# Patient Record
Sex: FEMALE | Race: WHITE | URBAN - METROPOLITAN AREA
[De-identification: names, ages, dates, MRNs, and addresses within clinical notes are randomized per-mention and may not be internally consistent; named-entity substitution may affect disease eponyms.]

---

## 2021-11-24 ENCOUNTER — EMERGENCY (EMERGENCY)
Facility: HOSPITAL | Age: 31
LOS: 1 days | Discharge: HOME | End: 2021-11-24
Admitting: STUDENT IN AN ORGANIZED HEALTH CARE EDUCATION/TRAINING PROGRAM
Payer: COMMERCIAL

## 2021-11-24 VITALS
TEMPERATURE: 98 F | OXYGEN SATURATION: 100 % | SYSTOLIC BLOOD PRESSURE: 132 MMHG | DIASTOLIC BLOOD PRESSURE: 74 MMHG | HEART RATE: 61 BPM | RESPIRATION RATE: 18 BRPM

## 2021-11-24 PROCEDURE — 99282 EMERGENCY DEPT VISIT SF MDM: CPT

## 2021-11-24 NOTE — ED PROVIDER NOTE - CLINICAL SUMMARY MEDICAL DECISION MAKING FREE TEXT BOX
32 y/o F employee sp needlestick injury, low risk exposure, pt does not want PEP. Tetanus and Hep B up to date. Blue packet filled out and patient referred to [facility].

## 2021-11-24 NOTE — ED PROVIDER NOTE - PATIENT PORTAL LINK FT
You can access the FollowMyHealth Patient Portal offered by [facility] by registering at the following website: [email or web address]. By joining [facility]’s FollowMyHealth portal, you will also be able to view your health information using other applications (apps) compatible with our system.

## 2021-11-24 NOTE — ED PROVIDER NOTE - NSFOLLOWUPINSTRUCTIONS_ED_ALL_ED_FT
Follow up with [facility] in 2-3 days [phone].  Clean affected area with soap and water.  Return to the ER for any persistent/worsening or new symptoms fevers, chills, redness, weakness, numbness or any concerning symptoms.

## 2021-11-24 NOTE — ED PROVIDER NOTE - OBJECTIVE STATEMENT
32 y/o F employee here c/o needlestick to the R thumb, occurring less then an hour ago. Patient was checking the source patient's fingerstick, when she scratched her gloved R thumb with the lancet. The lancet had visible blood. The patient cleaned the thumb with alcohol, then washed her hands with soap and water. Tetanus and Hep B up to date. No other complaints.

## 2022-03-07 PROBLEM — Z78.9 OTHER SPECIFIED HEALTH STATUS: Chronic | Status: ACTIVE | Noted: 2021-11-24

## 2022-05-04 PROBLEM — Z00.00 ENCOUNTER FOR PREVENTIVE HEALTH EXAMINATION: Status: ACTIVE | Noted: 2022-05-04

## 2022-06-29 ENCOUNTER — APPOINTMENT (OUTPATIENT)
Dept: UROGYNECOLOGY | Facility: CLINIC | Age: 32
End: 2022-06-29

## 2022-06-29 VITALS — BODY MASS INDEX: 23.55 KG/M2 | HEIGHT: 62 IN | WEIGHT: 128 LBS

## 2022-06-29 DIAGNOSIS — N93.0 POSTCOITAL AND CONTACT BLEEDING: ICD-10-CM

## 2022-06-29 PROCEDURE — 99203 OFFICE O/P NEW LOW 30 MIN: CPT

## 2022-07-01 DIAGNOSIS — Z78.9 OTHER SPECIFIED HEALTH STATUS: ICD-10-CM

## 2022-07-01 DIAGNOSIS — Z81.8 FAMILY HISTORY OF OTHER MENTAL AND BEHAVIORAL DISORDERS: ICD-10-CM

## 2022-07-01 DIAGNOSIS — Z80.3 FAMILY HISTORY OF MALIGNANT NEOPLASM OF BREAST: ICD-10-CM

## 2022-07-01 DIAGNOSIS — Z83.438 FAMILY HISTORY OF OTHER DISORDER OF LIPOPROTEIN METABOLISM AND OTHER LIPIDEMIA: ICD-10-CM

## 2022-07-01 RX ORDER — SPIRONOLACTONE 100 MG/1
100 TABLET ORAL
Refills: 0 | Status: ACTIVE | COMMUNITY

## 2022-07-08 NOTE — DISCUSSION/SUMMARY
[FreeTextEntry1] : Reviewed the above findings with the patient.  We discussed treatment options of doing nothing, versus continued medical and physical therapy management as well as surgical correction with reverse perineoplasty as well as excision of the tissue where the labia are meeting and sending that to pathology as well.  Risks and benefits were discussed we discussed the possibility of worsening dyspareunia and worsening of the condition postoperatively due to scar tissue.  Multiple questions were answered she has an appointment with Jessica Wooten  in August and will make a decision after that if she wishes to proceed with surgical correction she will call this office if she dissociates to proceed with surgical correction.  All questions were answered

## 2022-07-08 NOTE — HISTORY OF PRESENT ILLNESS
[FreeTextEntry1] : Since she has been with a new partner develops tear/fissure in the vagina - lubricates well intercoures - godd relationship with partner  + pain with penetration. pt had intercourse last night.\par has tried vaginal dilators.\par No vaginal deliveries \par tried valium/baclofem suppostiories and PT

## 2022-07-08 NOTE — PHYSICAL EXAM
[Chaperone Present] : A chaperone was present in the examining room during all aspects of the physical examination [No Acute Distress] : in no acute distress [Well developed] : well developed [Well Nourished] : ~L well nourished [Normal Mood/Affect] : mood and affect are normal [Warm and Dry] : was warm and dry to touch [Normal Appearance] : general appearance was normal [Normal] : normal [Anxiety] : patient is not anxious [Tenderness] : ~T no ~M abdominal tenderness observed [Distended] : not distended [de-identified] : + 2mm fissure/tear posterior slightly to patients left where labia meet eachother.

## 2022-08-03 ENCOUNTER — APPOINTMENT (OUTPATIENT)
Dept: UROGYNECOLOGY | Facility: CLINIC | Age: 32
End: 2022-08-03

## 2022-08-03 VITALS
TEMPERATURE: 97.9 F | SYSTOLIC BLOOD PRESSURE: 135 MMHG | WEIGHT: 128 LBS | BODY MASS INDEX: 23.55 KG/M2 | HEIGHT: 62 IN | DIASTOLIC BLOOD PRESSURE: 79 MMHG

## 2022-08-03 PROCEDURE — 99215 OFFICE O/P EST HI 40 MIN: CPT

## 2022-08-03 RX ORDER — DROSPIRENONE AND ETHINYL ESTRADIOL 0.02-3(28)
3-0.02 KIT ORAL
Qty: 84 | Refills: 0 | Status: ACTIVE | COMMUNITY
Start: 2022-04-19

## 2022-10-28 ENCOUNTER — APPOINTMENT (OUTPATIENT)
Dept: UROGYNECOLOGY | Facility: CLINIC | Age: 32
End: 2022-10-28

## 2022-10-28 PROCEDURE — 99214 OFFICE O/P EST MOD 30 MIN: CPT

## 2022-10-28 NOTE — PHYSICAL EXAM
[No Acute Distress] : in no acute distress [Well developed] : well developed [Well Nourished] : ~L well nourished [Good Hygeine] : demonstrates good hygeine [Oriented x3] : oriented to person, place, and time [Normal Memory] : ~T memory was ~L unimpaired [Normal Mood/Affect] : mood and affect are normal [No Edema] : ~T edema was not present [Warm and Dry] : was warm and dry to touch [Normal Gait] : gait was normal [No Lesions] : no lesions were seen on the external genitalia [Labia Majora] : were normal [Normal Appearance] : general appearance was normal [Pink Rugae] : pink rugae [No Bleeding] : there was no active vaginal bleeding [Normal] : no abnormalities [Exam Deferred] : was deferred [Tenderness] : ~T no ~M abdominal tenderness observed [Distended] : not distended [de-identified] : Q-tip touch test negative; No erosions, ulcerations, active fissures; area of old fissures at 6 o'clock  [de-identified] : +mucosal web; thinning of tissue noted. Derm changes c/w prolonged OCP use as her tissue improved after use of topical E/T cream x 3 months. [FreeTextEntry4] : PFMs wnl, supple, non-tender to palpation

## 2022-10-28 NOTE — HISTORY OF PRESENT ILLNESS
[FreeTextEntry1] : Emily is here for a f/u appointment. \par \par She has been using topical E/T cream ftp to introitus QHS. She reports that her lubrication has significantly improved. She is still fissuring with intercourse, however, not every time and she states the fissures are not as bad. \par She is using dilators 2x/week. No fissures with dilator use. \par \par She denies dyspareunia. She reports that the fissures are not painful, just uncomfortable x 24-36 hours afterwards. \par \par She was in [place] and developed a UTI. She was treated with augmentin but still feels like she has a UTI. She endorses some burning at the end of urination, bladder pressure and urgency. Denies increased frequency, hematuria, CVAT, fever, chills, n/v. \par She is requesting UA and C&S today.

## 2022-10-28 NOTE — ASSESSMENT
[FreeTextEntry1] : UA and C&S sent today.\par \par Emily's vulvar mucosa appears improved since her initial visit. \par Long discussion regarding treatment options for vulvar fissures. I offered to increase the strength of her topical E/T cream and she can use x 6 weeks or to send her back to Dr. Schwarz for perineoplasty. 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 \par \par Emily wishes to avoid sx for now. She just got engaged and plans to try to have a baby within the next 12-18 months. She would like to continue topical cream and see if she has any further improvement. \par \par Change topical cream to E 0.025%/T 0.03% and use ftp to introitus qhs. \par Continue dilation with large dilator 4-5x/week.\par May use topical lidocaine 7.5% in aloe gel prior to dilation and intercourse (cautioned Emily to put on lidocaine x 5 mins then wipe off excess prior to P-V penetration or her fiancee will become numb). \par \par RTO 6-8 weeks

## 2022-11-01 LAB
APPEARANCE: CLEAR
BACTERIA UR CULT: NORMAL
BACTERIA: NEGATIVE
BILIRUBIN URINE: NEGATIVE
BLOOD URINE: NEGATIVE
COLOR: NORMAL
GLUCOSE QUALITATIVE U: NEGATIVE
HYALINE CASTS: 0 /LPF
KETONES URINE: NEGATIVE
LEUKOCYTE ESTERASE URINE: NEGATIVE
MICROSCOPIC-UA: NORMAL
NITRITE URINE: NEGATIVE
PH URINE: 7
PROTEIN URINE: NEGATIVE
RED BLOOD CELLS URINE: 0 /HPF
SPECIFIC GRAVITY URINE: 1.02
SQUAMOUS EPITHELIAL CELLS: 0 /HPF
UROBILINOGEN URINE: NORMAL
WHITE BLOOD CELLS URINE: 1 /HPF

## 2022-11-26 ENCOUNTER — APPOINTMENT (OUTPATIENT)
Dept: AFTER HOURS CARE | Facility: EMERGENCY ROOM | Age: 32
End: 2022-11-26

## 2022-11-26 PROCEDURE — 99203 OFFICE O/P NEW LOW 30 MIN: CPT | Mod: 95

## 2022-11-26 NOTE — PHYSICAL EXAM
[No Acute Distress] : no acute distress [Well Nourished] : well nourished [Well Developed] : well developed [Well-Appearing] : well-appearing [Normal Sclera/Conjunctiva] : normal sclera/conjunctiva [Normal Outer Ear/Nose] : the outer ears and nose were normal in appearance [No Rash] : no rash [Coordination Grossly Intact] : coordination grossly intact [No Focal Deficits] : no focal deficits [Normal Affect] : the affect was normal [Normal Insight/Judgement] : insight and judgment were intact

## 2022-11-26 NOTE — PLAN
[FreeTextEntry1] : Rx Keflex 500 mg tid x 7 days Recommend Urology or Urogynecology for further evaluation as outpt

## 2022-11-26 NOTE — HISTORY OF PRESENT ILLNESS
[Home] : at home, [unfilled] , at the time of the visit. [Other Location: e.g. Home (Enter Location, City,State)___] : at [unfilled] [Verbal consent obtained from patient] : the patient, [unfilled] [FreeTextEntry8] : 33 yo F with hx of frequent UTI's (4 since the beginning of the year) c/o urinary frequency, urgency and dysuria which awoke her for sleep this AM.  No assoc fever, chills, N/V, or flank pain

## 2022-11-26 NOTE — REVIEW OF SYSTEMS
[Dysuria] : dysuria [Frequency] : frequency [Fever] : no fever [Chills] : no chills [Chest Pain] : no chest pain [Shortness Of Breath] : no shortness of breath [Abdominal Pain] : no abdominal pain [Nausea] : no nausea [Vomiting] : no vomiting [Skin Rash] : no skin rash [FreeTextEntry8] : Urgency

## 2023-03-14 ENCOUNTER — APPOINTMENT (OUTPATIENT)
Dept: UROGYNECOLOGY | Facility: CLINIC | Age: 33
End: 2023-03-14

## 2023-04-14 ENCOUNTER — APPOINTMENT (OUTPATIENT)
Dept: UROGYNECOLOGY | Facility: CLINIC | Age: 33
End: 2023-04-14
Payer: COMMERCIAL

## 2023-04-14 ENCOUNTER — TRANSCRIPTION ENCOUNTER (OUTPATIENT)
Age: 33
End: 2023-04-14

## 2023-04-14 VITALS — TEMPERATURE: 98.3 F | SYSTOLIC BLOOD PRESSURE: 114 MMHG | DIASTOLIC BLOOD PRESSURE: 75 MMHG

## 2023-04-14 PROCEDURE — 99213 OFFICE O/P EST LOW 20 MIN: CPT

## 2023-04-14 PROCEDURE — 51798 US URINE CAPACITY MEASURE: CPT

## 2023-04-14 NOTE — PHYSICAL EXAM
[No Acute Distress] : in no acute distress [Well developed] : well developed [Well Nourished] : ~L well nourished [Good Hygeine] : demonstrates good hygeine [Oriented x3] : oriented to person, place, and time [Normal Memory] : ~T memory was ~L unimpaired [Normal Mood/Affect] : mood and affect are normal [No Edema] : ~T edema was not present [Warm and Dry] : was warm and dry to touch [Normal Gait] : gait was normal [No Lesions] : no lesions were seen on the external genitalia [Labia Majora] : were normal [Normal] : was normal [Normal Appearance] : general appearance was normal [Pink Rugae] : pink rugae [No Bleeding] : there was no active vaginal bleeding [Exam Deferred] : was deferred [Post Void Residual ____ml] : post void residual was [unfilled] ml [Tenderness] : ~T no ~M abdominal tenderness observed [Distended] : not distended [de-identified] : Q-tip touch test negative; No erosions, ulcerations, no fissure [de-identified] : +mucosal web; thinning of tissue noted. Derm changes c/w prolonged OCP use as her tissue improved after use of topical E/T cream x 3 months.

## 2023-04-14 NOTE — DISCUSSION/SUMMARY
[FreeTextEntry1] : Emily's vulvar mucosa appears improved since her initial visit. \par Long discussion regarding treatment options for vulvar fissures. She can go back and see Dr. Schwarz for perineoplasty. Emily wishes to avoid sx for now. She just got engaged and plans to try to have a baby within the next 12-18 months. She would like to continue topical cream and see if she has any further improvement. \par We discussed injections to area of fissuring. She understands this is an off-label, non-FDA approved use of injections.\par \par Resume E/T cream at original strength to introitus qhs.\par Continue dilation with large dilator 4-5x/week. I suggested that she measure her fiancee and purchase a dilator that corresponds to his girth.\par May use topical lidocaine 7.5% in aloe gel prior to dilation and intercourse (cautioned Emily to put on lidocaine x 5 mins then wipe off excess prior to P-V penetration or her fiancee will become numb). \par \par I gave Emily rxs for UA, C&S. She was instructed that at the first s/s of UTI to go to the lab with the rx for UA and C&S and drop off specimen. She understands that she needs to call the office 3-4d after she drops off her urine for the results. In the interim, she may take OTC AZO prn with food and should increase water intake.\par If + cultured recurrent UTIs, will put her on ppx.\par \par RTO 4-6 weeks

## 2023-04-14 NOTE — HISTORY OF PRESENT ILLNESS
[FreeTextEntry1] : Emily is here for a f/u appointment. \par \par She has been using topical E/T cream ftp to introitus QHS. She reports that her lubrication has significantly improved. She is still fissuring with intercourse, however, not every time and she states the fissures are not as bad. \par She is using dilators 3x/week. No fissures with dilator use. Dilator is not the same size as her partner, just a bit smaller.\par \par She denies dyspareunia. She reports that the fissures are not painful, just uncomfortable x 24-36 hours afterwards. \par \par No UTI symptoms today.\par Last time treated was February 2023 ([facility]). Culture proved negative. \par \par

## 2023-05-11 ENCOUNTER — APPOINTMENT (OUTPATIENT)
Dept: UROGYNECOLOGY | Facility: CLINIC | Age: 33
End: 2023-05-11
Payer: COMMERCIAL

## 2023-05-11 VITALS
SYSTOLIC BLOOD PRESSURE: 125 MMHG | HEIGHT: 62 IN | OXYGEN SATURATION: 99 % | HEART RATE: 63 BPM | DIASTOLIC BLOOD PRESSURE: 84 MMHG | BODY MASS INDEX: 22.82 KG/M2 | WEIGHT: 124 LBS

## 2023-05-11 PROCEDURE — 99212 OFFICE O/P EST SF 10 MIN: CPT | Mod: 25

## 2023-05-11 PROCEDURE — 11900 INJECT SKIN LESIONS </W 7: CPT

## 2023-05-11 NOTE — HISTORY OF PRESENT ILLNESS
[FreeTextEntry1] : Emily is here for a f/u appointment. \par \par She has been using topical E/T cream ftp to introitus QHS. She reports that her lubrication has significantly improved. She is still fissuring with intercourse, however, not every time and she states the fissures are not as bad. \par She is using dilators 3x/week. No fissures with dilator use. Dilator is not the same size as her partner, just a bit smaller.\par \par She denies dyspareunia. She reports that the fissures are not painful, just uncomfortable x 24-36 hours afterwards. \par \par No UTI symptoms today.\par \par \par

## 2023-05-11 NOTE — PROCEDURE
[Other ___] : [unfilled] [Patient] : the patient [Consent Obtained] : written consent was obtained prior to the procedure and is detailed in the patient's record [None] : none [Betadine] : betadine [No Complications] : none [Tolerated Well] : the patient tolerated the procedure well [Post procedure instructions and information given] : post procedure instructions and information given and reviewed with patient. [0] : 0 [FreeTextEntry1] : SEE SCANNED PROCEDURE NOTE

## 2023-05-11 NOTE — DISCUSSION/SUMMARY
[FreeTextEntry1] : Emily's vulvar mucosa appears improved since her initial visit. \par \par Intralesional injections explained in detail. Risks, including but not limited to, (infection bleeding, increased pain, nerve injury, permanent nerve/muscle damage) vs. benefits, including but not limited to (decreased pain, decreased muscle spasticity), SE/AE, including but not limited to, (allergic reaction, systemic absorption to lidocaine), and alternative treatment options (including but not limited to topical creams, perineoplasty, physical therapy, home dilators, stretching, etc) were discussed with patient. Patient is aware the use of intralesional injections is an off-label non-FDA approved treatment for vulvar fissures. She verbalized understanding of everything we discussed.\par \par After PE, Emily elected to proceed with intralesional injections to area of fissure. Consent obtained. See scanned procedure note.\par \par Continue E/T cream at original strength to introitus qhs.\par Continue dilation with large dilator 4-5x/week. I suggested that she measure her fiancee and purchase a dilator that corresponds to his girth.\par May use topical lidocaine 7.5% in aloe gel prior to dilation and intercourse (cautioned Emily to put on lidocaine x 5 mins then wipe off excess prior to P-V penetration or her fiancee will become numb). \par \par Emily has rxs for UA, C&S and knows that at the first s/s of UTI to go to the lab with the rx for UA and C&S and drop off specimen. She understands that she needs to call the office 3-4d after she drops off her urine for the results. In the interim, she may take OTC AZO prn with food and should increase water intake.\par If + cultured recurrent UTIs, will put her on ppx.\par \par RTO 4-6 weeks

## 2023-05-11 NOTE — PHYSICAL EXAM
[No Acute Distress] : in no acute distress [Well developed] : well developed [Well Nourished] : ~L well nourished [Good Hygeine] : demonstrates good hygeine [Oriented x3] : oriented to person, place, and time [Normal Memory] : ~T memory was ~L unimpaired [Normal Mood/Affect] : mood and affect are normal [No Edema] : ~T edema was not present [Warm and Dry] : was warm and dry to touch [Normal Gait] : gait was normal [No Lesions] : no lesions were seen on the external genitalia [Labia Majora] : were normal [Normal] : was normal [Normal Appearance] : general appearance was normal [Pink Rugae] : pink rugae [No Bleeding] : there was no active vaginal bleeding [Exam Deferred] : was deferred [Tenderness] : ~T no ~M abdominal tenderness observed [Distended] : not distended [de-identified] : Q-tip touch test negative; No erosions, ulcerations, no fissure [de-identified] : +mucosal web; thinning of tissue noted.

## 2023-06-21 ENCOUNTER — NON-APPOINTMENT (OUTPATIENT)
Age: 33
End: 2023-06-21

## 2023-06-21 ENCOUNTER — APPOINTMENT (OUTPATIENT)
Dept: UROGYNECOLOGY | Facility: CLINIC | Age: 33
End: 2023-06-21

## 2023-06-22 ENCOUNTER — APPOINTMENT (OUTPATIENT)
Dept: UROGYNECOLOGY | Facility: CLINIC | Age: 33
End: 2023-06-22
Payer: COMMERCIAL

## 2023-06-22 VITALS
WEIGHT: 124 LBS | HEART RATE: 91 BPM | SYSTOLIC BLOOD PRESSURE: 106 MMHG | BODY MASS INDEX: 22.82 KG/M2 | DIASTOLIC BLOOD PRESSURE: 67 MMHG | HEIGHT: 62 IN | OXYGEN SATURATION: 100 %

## 2023-06-22 PROCEDURE — 11900 INJECT SKIN LESIONS </W 7: CPT

## 2023-06-22 PROCEDURE — 99213 OFFICE O/P EST LOW 20 MIN: CPT | Mod: 25

## 2023-06-22 NOTE — PHYSICAL EXAM
[No Acute Distress] : in no acute distress [Well developed] : well developed [Well Nourished] : ~L well nourished [Good Hygeine] : demonstrates good hygeine [Oriented x3] : oriented to person, place, and time [Normal Memory] : ~T memory was ~L unimpaired [Normal Mood/Affect] : mood and affect are normal [No Edema] : ~T edema was not present [Warm and Dry] : was warm and dry to touch [Normal Gait] : gait was normal [No Lesions] : no lesions were seen on the external genitalia [Labia Majora] : were normal [Normal] : was normal [Normal Appearance] : general appearance was normal [Pink Rugae] : pink rugae [No Bleeding] : there was no active vaginal bleeding [Exam Deferred] : was deferred [Tenderness] : ~T no ~M abdominal tenderness observed [Distended] : not distended [de-identified] : Q-tip touch test negative; No erosions, ulcerations, no fissure [de-identified] : +mucosal web; thinning of tissue noted.

## 2023-06-22 NOTE — DISCUSSION/SUMMARY
[FreeTextEntry1] : Emily's vulvar mucosa appears improved since her initial visit. \par \par After PE, Emily elected to proceed with intralesional injections to area of fissure. See scanned procedure note.\par \par Continue E/T cream at original strength to introitus qhs.\par Continue dilation with large dilator 4-5x/week. I suggested that she measure her fiancee and purchase a dilator that corresponds to his girth.\par May use topical lidocaine 7.5% in aloe gel prior to dilation and intercourse (cautioned Emily to put on lidocaine x 5 mins then wipe off excess prior to P-V penetration or her fiancee will become numb). \par \par Emily has rxs for UA, C&S and knows that at the first s/s of UTI to go to the lab with the rx for UA and C&S and drop off specimen. She understands that she needs to call the office 3-4d after she drops off her urine for the results. In the interim, she may take OTC AZO prn with food and should increase water intake.\par If + cultured recurrent UTIs, will put her on ppx.\par \par RTO 4-6 weeks

## 2023-06-22 NOTE — HISTORY OF PRESENT ILLNESS
[FreeTextEntry1] : Emily is here for a f/u appointment. \par \par She has been using topical E/T cream ftp to introitus QHS. She reports that her lubrication has significantly improved. She is still fissuring with intercourse, however, not every time and she states the fissures are not as bad. She did not notice any appreciable improvement after her first injection.\par She is using dilators 3x/week. No fissures with dilator use. Dilator is not the same size as her partner, just a bit smaller.\par \par She denies dyspareunia. She reports that the fissures are not painful, just uncomfortable x 24-36 hours afterwards. \par \par No UTI symptoms today.\par \par \par

## 2023-08-02 ENCOUNTER — APPOINTMENT (OUTPATIENT)
Dept: UROGYNECOLOGY | Facility: CLINIC | Age: 33
End: 2023-08-02
Payer: COMMERCIAL

## 2023-08-02 VITALS — SYSTOLIC BLOOD PRESSURE: 132 MMHG | DIASTOLIC BLOOD PRESSURE: 84 MMHG | TEMPERATURE: 98.8 F

## 2023-08-02 PROCEDURE — 99212 OFFICE O/P EST SF 10 MIN: CPT | Mod: 25

## 2023-08-02 PROCEDURE — 11900 INJECT SKIN LESIONS </W 7: CPT

## 2023-08-02 NOTE — PHYSICAL EXAM
[No Acute Distress] : in no acute distress [Well developed] : well developed [Well Nourished] : ~L well nourished [Good Hygeine] : demonstrates good hygeine [Oriented x3] : oriented to person, place, and time [Normal Memory] : ~T memory was ~L unimpaired [Normal Mood/Affect] : mood and affect are normal [No Edema] : ~T edema was not present [Warm and Dry] : was warm and dry to touch [Normal Gait] : gait was normal [No Lesions] : no lesions were seen on the external genitalia [Labia Majora] : were normal [Normal] : was normal [Normal Appearance] : general appearance was normal [Pink Rugae] : pink rugae [No Bleeding] : there was no active vaginal bleeding [Exam Deferred] : was deferred [Tenderness] : ~T no ~M abdominal tenderness observed [Distended] : not distended [de-identified] : Q-tip touch test negative; No erosions, ulcerations. +small superficial fissure at 7 o'clock. [de-identified] : +mucosal web; thinning of tissue noted.

## 2023-08-02 NOTE — HISTORY OF PRESENT ILLNESS
[FreeTextEntry1] : Emily is here for a f/u appointment.   She has been using topical E/T cream ftp to introitus QHS. She reports that her lubrication has significantly improved. She is still fissuring with intercourse, however, not every time and she states the fissures are not as bad. She states they are more superficial and do not bleed as long.  She thinks the injections are helping slightly. She is using dilators 3x/week. No fissures with dilator use. Dilator is not the same size as her partner, just a bit smaller.  She denies dyspareunia. She reports that the fissures are not painful, just uncomfortable x 24-36 hours afterwards.   No UTI symptoms today.

## 2023-09-13 ENCOUNTER — APPOINTMENT (OUTPATIENT)
Dept: UROGYNECOLOGY | Facility: CLINIC | Age: 33
End: 2023-09-13
Payer: COMMERCIAL

## 2023-09-13 VITALS
BODY MASS INDEX: 22.82 KG/M2 | SYSTOLIC BLOOD PRESSURE: 133 MMHG | HEIGHT: 62 IN | WEIGHT: 124 LBS | DIASTOLIC BLOOD PRESSURE: 88 MMHG

## 2023-09-13 PROCEDURE — 11900 INJECT SKIN LESIONS </W 7: CPT

## 2023-09-13 PROCEDURE — 99213 OFFICE O/P EST LOW 20 MIN: CPT | Mod: 25

## 2023-10-25 ENCOUNTER — APPOINTMENT (OUTPATIENT)
Dept: UROGYNECOLOGY | Facility: CLINIC | Age: 33
End: 2023-10-25
Payer: COMMERCIAL

## 2023-10-25 VITALS — DIASTOLIC BLOOD PRESSURE: 81 MMHG | TEMPERATURE: 97.9 F | HEART RATE: 60 BPM | SYSTOLIC BLOOD PRESSURE: 123 MMHG

## 2023-10-25 PROCEDURE — 11900 INJECT SKIN LESIONS </W 7: CPT

## 2023-10-25 PROCEDURE — 99213 OFFICE O/P EST LOW 20 MIN: CPT | Mod: 25

## 2023-12-13 ENCOUNTER — APPOINTMENT (OUTPATIENT)
Dept: UROGYNECOLOGY | Facility: CLINIC | Age: 33
End: 2023-12-13
Payer: COMMERCIAL

## 2023-12-13 VITALS — TEMPERATURE: 98.2 F | DIASTOLIC BLOOD PRESSURE: 81 MMHG | SYSTOLIC BLOOD PRESSURE: 121 MMHG

## 2023-12-13 PROCEDURE — 56821 COLPOSCOPY VULVA W/BIOPSY: CPT

## 2023-12-13 PROCEDURE — 99213 OFFICE O/P EST LOW 20 MIN: CPT | Mod: 25

## 2023-12-13 NOTE — PHYSICAL EXAM
[No Acute Distress] : in no acute distress [Well developed] : well developed [Well Nourished] : ~L well nourished [Good Hygeine] : demonstrates good hygeine [Oriented x3] : oriented to person, place, and time [Normal Memory] : ~T memory was ~L unimpaired [Normal Mood/Affect] : mood and affect are normal [Respirations regular] : ~T respiratory rate was regular [Warm and Dry] : was warm and dry to touch [Normal Gait] : gait was normal [No Lesions] : no lesions were seen on the external genitalia [Labia Majora] : were normal [Labia Minora] : were normal [Normal] : was normal [Normal Appearance] : general appearance was normal [Pink Rugae] : pink rugae [Exam Deferred] : was deferred [No Edema] : ~T edema was present [Tenderness] : ~T no ~M abdominal tenderness observed [Distended] : not distended [de-identified] : SEE SCANNED PHOTOS [de-identified] : SEE SCANNED PHOTOS

## 2023-12-13 NOTE — HISTORY OF PRESENT ILLNESS
[FreeTextEntry1] : Emily is here for a f/u appointment.   She has been using topical E/T cream ftp to introitus HS approx 3-4x/week. She reports that her lubrication has significantly improved. She is still fissuring with intercourse, however, not every time and she states the fissures are not as deep, painful and they heal much quicker. She is also able to have intercourse longer before she fissures. She states they are more superficial and do not bleed as long.  She thinks the injections are helping slightly. She is using dilators 3x/week. No fissures with dilator use. Dilator is not the same size as her partner, just a bit smaller.  She denies dyspareunia. She reports that the fissures are not painful, just uncomfortable x 24-36 hours afterwards.   No UTI symptoms today. She has not had a UTI or symptoms of UTI in a while. She denies urinary complaints.

## 2023-12-13 NOTE — DISCUSSION/SUMMARY
[FreeTextEntry1] : Emily was consented for vulvoscopy with vulvar biopsy to be done in the office today. Risks and benefits rev'd in detail including but not limited to: infection, bleeding, scarring of vulva, increased pain. She verbalized understanding and consented to proceed with biopsy.   Vulvar biopsy was done in the office today.  1cc 2% lidocaine was injected to numb biopsy area(s). Vulva was cleaned with betadine. Biopsy was taken from area as noted on scanned photos with mini tischelrs and put in formalin container and sent to dermatopathology. Monsel's solution applied and hemostasis obtained.  Patient tolerated procedure well without complaints/complications. Emily was instructed on the following: -avoid getting vulva wet x24 hour. - -after the first 24 hrs, she may shower, but she should avoid baths x3-5day.   -pelvic rest x 1week -do not to remove the "scab" formed by the Monsel's, let it fall off itself, usually within 3-5d -call the office with any increased pain, bleeding, discharge, odor, fever -stop all topical creams x 1 week -may use ice packs for comfort -may apply thin coat of neosporin/bacitracin after scab falls off  She verbalized understanding of all instructions and is going to f/u in the office in 2-3 weeks. Written instructions given to patient.  Restart topical estradiol ftp to introitus qhs in 1 week. Tylenol #3 1-2 tabs po q 4-6hrs prn #10

## 2023-12-14 ENCOUNTER — NON-APPOINTMENT (OUTPATIENT)
Age: 33
End: 2023-12-14

## 2023-12-21 LAB — CORE LAB BIOPSY: NORMAL

## 2024-01-03 ENCOUNTER — APPOINTMENT (OUTPATIENT)
Dept: UROGYNECOLOGY | Facility: CLINIC | Age: 34
End: 2024-01-03
Payer: COMMERCIAL

## 2024-01-03 VITALS — HEART RATE: 67 BPM | TEMPERATURE: 97.3 F | SYSTOLIC BLOOD PRESSURE: 111 MMHG | DIASTOLIC BLOOD PRESSURE: 76 MMHG

## 2024-01-03 DIAGNOSIS — N39.0 URINARY TRACT INFECTION, SITE NOT SPECIFIED: ICD-10-CM

## 2024-01-03 PROCEDURE — 99213 OFFICE O/P EST LOW 20 MIN: CPT

## 2024-01-03 NOTE — DISCUSSION/SUMMARY
[FreeTextEntry1] : 1) Vulvar/perineal fissure -resume topical ET cream -discussed vulvar bx results -options discussed. She does not want surgery at this time. -continue injections q 3 months for now  2) AYUSH  -call office w symptoms of UTI for rx for UA and C&S.  RTO 2-3 months

## 2024-01-03 NOTE — HISTORY OF PRESENT ILLNESS
[FreeTextEntry1] : Emily is here for f/u s/p vulvar biopsy. Bx showed chronic inflammation, otherwise negative for vulvar derm. She has not yet restarted topical ET cream. She has been using bacitracin. She has not had intercourse since biopsy.  She does think that injections have been helping. She denies UTI symptoms.

## 2024-01-03 NOTE — PHYSICAL EXAM
[No Acute Distress] : in no acute distress [Well developed] : well developed [Well Nourished] : ~L well nourished [Good Hygeine] : demonstrates good hygeine [Oriented x3] : oriented to person, place, and time [Normal Memory] : ~T memory was ~L unimpaired [Normal Mood/Affect] : mood and affect are normal [Respirations regular] : ~T respiratory rate was regular [Warm and Dry] : was warm and dry to touch [Normal Gait] : gait was normal [Labia Majora] : were normal [Labia Minora] : were normal [Normal] : was normal [Normal Appearance] : general appearance was normal [Pink Rugae] : pink rugae [Exam Deferred] : was deferred [FreeTextEntry1] : Biopsy site healing well. +granulation tissue [No Edema] : ~T edema was present [Tenderness] : ~T no ~M abdominal tenderness observed [Distended] : not distended [de-identified] : Q-tip touch test negative. No erythema, no erosions, no ulcerations, no fissures.

## 2024-01-12 NOTE — END OF VISIT
[facility]   Physical Therapy Treatment Note      Name: Ene Clemons  Clinic Number: [id number]    Therapy Diagnosis:   No diagnosis found.    Physician: Radhika Neff MD    Visit Date: 1/12/2024    Physician Orders: PT Eval and Treat   Medical Diagnosis from Referral:   M54.16 (ICD-10-CM) - Lumbar radiculopathy   M46.1 (ICD-10-CM) - Bilateral sacroiliitis      Evaluation Date: 12/14/2023  Plan of Care Expiration: 1/25/2024  Progress Note Due: 1/12/2024  Plan of care Certification: 12/14/2023 to 1/25/2024           .  Date of Surgery: N/A  Visit # / Visits authorized: 5/ 12   FOTO: 43/ 100     Precautions: Standard      Time In: 12:20 PM  Time Out: 1:00   PM  Total Billable Time:  40  minutes    PTA Visit #: 0/5       Subjective     Patient reports: mild pain in center of lumbar spine  She was compliant with home exercise program.      Pain: 5/10 R hip and lower lumbar   Location: bilateral back/hip    Objective      Objective Measures updated at progress report unless specified.     Treatment     Ene received the treatments listed below:      therapeutic exercises to develop strength, endurance, ROM, and flexibility for  40   minutes including:  Sci Fit Stepper x 5 min  Bird Dogs x30   Cat/cow 1x10   Sciatic Nerve Glides x40 each LE  Open Books x10 BL   Dead Bugs x10 BL   Bridging w/ piriformis stretch x15 BL  Pelvic tilts 1x15 with 3 sec holds  Lumbar rotation with swiss ball 1x15 with 3 sec holds   Swiss Ball Rolls (supine) (flexion) x20  Hip ADD with ball 2x10 with 3 sec holds  Hip ABD with TB 2x10 with TB  [place] Stretch 4x20 secs  Seated ball roll outs 5 x10  Seated piriformis stretch/hip flexor stretch 5x10       (Not this visit)  manual therapy techniques were applied to the bilateral lumbar paraspinals  for  minutes, including:        (Not this visit)  supervised modalities after being cleared for contradictions: IFC Electrical Stimulation:  Ene received IFC Electrical  Stimulation for pain control applied to the     . Pt received stimulation at 100 % scan at a frequency of       for       minutes. Ene tolderated treatment well without any adverse effects.      hot pack for     minutes to .    cold pack for       minutes to .    Patient Education and Home Exercises       Education provided: yes    Written Home Exercises Provided: yes. Exercises were reviewed and Ene was able to demonstrate them prior to the end of the session.  Ene demonstrated good  understanding of the education provided. See Electronic Medical Record under Patient Instructions for exercises provided during therapy sessions    Assessment     Pt tolerated Tx well today to promote increased lumbar ROM and strengthening.      Ene Is progressing well towards her goals.   Patient prognosis is Excellent.     Patient will continue to benefit from skilled outpatient physical therapy to address the deficits listed in the problem list box on initial evaluation, provide pt/family education and to maximize pt's level of independence in the home and community environment.     Patient's spiritual, cultural and educational needs considered and pt agreeable to plan of care and goals.     Anticipated barriers to physical therapy: none    Goals:  Short Term Goals: 4 weeks   1. Patient will be Independent with Home Exercise Program   2. Patient will demonstrate with improved Posture and Body Mechanics  3. Patient will increase Lumbosacral Range of Motion by 25% pain free  4. Patient will increase Lower Extremity and Core Strength to grossly 4+/5  5. Patient will decrease complaints of pain with activity to Less than or Equal to 5/10      Long Term Goals: 6 weeks   1. Patient will tolerate 30 minutes of activity with complaints of Pain at Less Than or Equal to 4/10      Plan     Pt will continue with POC.    Demario Figueroa, PT      [Time Spent: ___ minutes] : I have spent [unfilled] minutes of time on the encounter.

## 2024-02-28 ENCOUNTER — APPOINTMENT (OUTPATIENT)
Dept: UROGYNECOLOGY | Facility: CLINIC | Age: 34
End: 2024-02-28
Payer: COMMERCIAL

## 2024-02-28 VITALS — SYSTOLIC BLOOD PRESSURE: 137 MMHG | DIASTOLIC BLOOD PRESSURE: 87 MMHG | TEMPERATURE: 98 F

## 2024-02-28 PROCEDURE — 11900 INJECT SKIN LESIONS </W 7: CPT

## 2024-02-28 PROCEDURE — 99213 OFFICE O/P EST LOW 20 MIN: CPT | Mod: 25

## 2024-02-28 RX ORDER — HALOBETASOL PROPIONATE 0.5 MG/G
0.05 OINTMENT TOPICAL
Qty: 1 | Refills: 0 | Status: ACTIVE | COMMUNITY
Start: 2024-02-28 | End: 1900-01-01

## 2024-02-28 RX ORDER — CLOBETASOL PROPIONATE 0.5 MG/G
0.05 OINTMENT TOPICAL
Qty: 1 | Refills: 0 | Status: COMPLETED | COMMUNITY
Start: 2024-02-28 | End: 2024-02-28

## 2024-02-28 RX ORDER — ACETAMINOPHEN AND CODEINE PHOSPHATE 300; 30 MG/1; MG/1
300-30 TABLET ORAL
Qty: 10 | Refills: 0 | Status: COMPLETED | COMMUNITY
Start: 2023-12-13 | End: 2024-02-28

## 2024-02-28 RX ORDER — CEPHALEXIN 500 MG/1
500 CAPSULE ORAL 3 TIMES DAILY
Qty: 21 | Refills: 0 | Status: COMPLETED | COMMUNITY
Start: 2022-11-26 | End: 2024-02-28

## 2024-02-28 NOTE — PROCEDURE
[Other ___] : [unfilled] [Patient] : the patient [Consent Obtained] : written consent was obtained prior to the procedure and is detailed in the patient's record [Betadine] : betadine [None] : none [No Complications] : none [Tolerated Well] : the patient tolerated the procedure well [0] : 0 [Post procedure instructions and information given] : post procedure instructions and information given and reviewed with patient. [FreeTextEntry1] : SEE SCANNED PROCEDURE NOTE

## 2024-02-28 NOTE — HISTORY OF PRESENT ILLNESS
[FreeTextEntry1] : Emily is here for a f/u appointment.   She has been using topical E/T cream ftp to introitus QHS. She reports that her lubrication has significantly improved. She is still fissuring with intercourse, which has gotten worse since her vulvar bx. She has not had injections since October 2023. She thinks they helped to decrease fissuring significantly.  She is using dilators 3x/week. No fissures with dilator use. Dilator is not the same size as her partner, just a bit smaller.  She denies dyspareunia. She reports that the fissures are not painful, just uncomfortable x 24-36 hours afterwards.   No UTI symptoms today. She has had some increased discharge.

## 2024-02-28 NOTE — DISCUSSION/SUMMARY
[FreeTextEntry1] : Emily's vulvar mucosa appears improved since her initial visit.   After PE, Emily elected to proceed with intralesional injections to area of fissure. See scanned procedure note.  Continue E/T cream at original strength to introitus qhs. Continue dilation with large dilator 4-5x/week. I suggested that she measure her fiancee and purchase a dilator that corresponds to his girth. May use topical lidocaine 7.5% in aloe gel prior to dilation and intercourse (cautioned Emily to put on lidocaine x 5 mins then wipe off excess prior to P-V penetration or her fiancee will become numb).   Clobetasol 0.05% ointment pea-size amt to area of fissure after bath/shower QD x 2 weeks then decrease to 3x/week.   Emily has rxs for UA, C&S and knows that at the first s/s of UTI to go to the lab with the rx for UA and C&S and drop off specimen. She understands that she needs to call the office 3-4d after she drops off her urine for the results. In the interim, she may take OTC AZO prn with food and should increase water intake. If + cultured recurrent UTIs, will put her on ppx.  Long discussion again regarding vulvar fissures. Treatment options once again discussed.   RTO 6-8 weeks

## 2024-02-28 NOTE — PHYSICAL EXAM
[Well developed] : well developed [No Acute Distress] : in no acute distress [Well Nourished] : ~L well nourished [Good Hygeine] : demonstrates good hygeine [Oriented x3] : oriented to person, place, and time [Normal Memory] : ~T memory was ~L unimpaired [Normal Mood/Affect] : mood and affect are normal [No Edema] : ~T edema was not present [Warm and Dry] : was warm and dry to touch [Normal Gait] : gait was normal [No Lesions] : no lesions were seen on the external genitalia [Labia Majora] : were normal [Normal] : was normal [Normal Appearance] : general appearance was normal [No Bleeding] : there was no active vaginal bleeding [Pink Rugae] : pink rugae [Exam Deferred] : was deferred [Tenderness] : ~T no ~M abdominal tenderness observed [Distended] : not distended [de-identified] : Q-tip touch test negative; No erosions, ulcerations. +small superficial fissure at 6 o'clock. [de-identified] : thinning of mucosa noted.

## 2024-02-29 LAB
CANDIDA VAG CYTO: NOT DETECTED
G VAGINALIS+PREV SP MTYP VAG QL MICRO: NOT DETECTED
T VAGINALIS VAG QL WET PREP: NOT DETECTED

## 2024-04-24 ENCOUNTER — APPOINTMENT (OUTPATIENT)
Dept: UROGYNECOLOGY | Facility: CLINIC | Age: 34
End: 2024-04-24
Payer: COMMERCIAL

## 2024-04-24 VITALS — TEMPERATURE: 97.7 F | DIASTOLIC BLOOD PRESSURE: 81 MMHG | SYSTOLIC BLOOD PRESSURE: 127 MMHG

## 2024-04-24 DIAGNOSIS — K60.2 ANAL FISSURE, UNSPECIFIED: ICD-10-CM

## 2024-04-24 DIAGNOSIS — Z98.890 OTHER SPECIFIED POSTPROCEDURAL STATES: ICD-10-CM

## 2024-04-24 DIAGNOSIS — Z87.42 PERSONAL HISTORY OF OTHER DISEASES OF THE FEMALE GENITAL TRACT: ICD-10-CM

## 2024-04-24 PROCEDURE — 99213 OFFICE O/P EST LOW 20 MIN: CPT

## 2024-04-24 NOTE — PHYSICAL EXAM
[No Acute Distress] : in no acute distress [Well developed] : well developed [Well Nourished] : ~L well nourished [Good Hygeine] : demonstrates good hygeine [Oriented x3] : oriented to person, place, and time [Normal Memory] : ~T memory was ~L unimpaired [Normal Mood/Affect] : mood and affect are normal [No Edema] : ~T edema was not present [Warm and Dry] : was warm and dry to touch [Normal Gait] : gait was normal [No Lesions] : no lesions were seen on the external genitalia [Labia Majora] : were normal [Normal] : was normal [Normal Appearance] : general appearance was normal [Pink Rugae] : pink rugae [No Bleeding] : there was no active vaginal bleeding [Exam Deferred] : was deferred [Tenderness] : ~T no ~M abdominal tenderness observed [Distended] : not distended [de-identified] : Q-tip touch test negative; No erosions, ulcerations; moderate fissure at 6 o'clock noted with some scant spotting. [de-identified] : thinning of mucosa noted.

## 2024-04-24 NOTE — DISCUSSION/SUMMARY
[FreeTextEntry1] : Emily's vulvar mucosa appears improved since her initial visit however, she is still fissuring.  Hold injections. Monsel's applied to fissure today. Continue E/T cream at original strength to introitus qhs. Continue dilation with large dilator 4-5x/week. May use topical lidocaine 7.5% in aloe gel prior to dilation and intercourse (cautioned Emily to put on lidocaine x 5 mins then wipe off excess prior to P-V penetration or her fiancee will become numb).   Clobetasol 0.05% ointment pea-size amt to area of fissure after bath/shower QD x 2 weeks then decrease to 3x/week.   Emily has rxs for UA, C&S and knows that at the first s/s of UTI to go to the lab with the rx for UA and C&S and drop off specimen. She understands that she needs to call the office 3-4d after she drops off her urine for the results. In the interim, she may take OTC AZO prn with food and should increase water intake. If + cultured recurrent UTIs, will put her on ppx.  Long discussion again regarding vulvar fissures. Treatment options once again discussed. Offered to send her to urogyn surgeon for repair. She does not want to have sx at this time.  RTO 2 months

## 2024-04-24 NOTE — HISTORY OF PRESENT ILLNESS
[FreeTextEntry1] : Emily is here for a f/u appointment.   She has been using topical E/T cream ftp to introitus QHS. She reports that her lubrication has significantly improved. She is still fissuring with intercourse, which has gotten worse since her vulvar bx. She had injections at her last visit but does not think it helped. She is using dilators 3x/week. No fissures with dilator use. Dilator is the same size as her partner. She had intercourse last night and reports a significant fissure. She just started using a CBD lube. She denies dyspareunia. She reports that the fissures are not painful, just uncomfortable x 24-36 hours afterwards.   No UTI symptoms today. She was treated for VVC about 4 weeks ago. Denies symptoms of vaginal infection today.

## 2024-06-26 ENCOUNTER — APPOINTMENT (OUTPATIENT)
Dept: UROGYNECOLOGY | Facility: CLINIC | Age: 34
End: 2024-06-26
Payer: COMMERCIAL

## 2024-06-26 VITALS — TEMPERATURE: 98.3 F | SYSTOLIC BLOOD PRESSURE: 116 MMHG | DIASTOLIC BLOOD PRESSURE: 79 MMHG

## 2024-06-26 DIAGNOSIS — N90.89 OTHER SPECIFIED NONINFLAMMATORY DISORDERS OF VULVA AND PERINEUM: ICD-10-CM

## 2024-06-26 DIAGNOSIS — L98.9 DISORDER OF THE SKIN AND SUBCUTANEOUS TISSUE, UNSPECIFIED: ICD-10-CM

## 2024-06-26 PROCEDURE — 99213 OFFICE O/P EST LOW 20 MIN: CPT

## 2024-06-26 RX ORDER — TACROLIMUS 0.3 MG/G
0.03 OINTMENT TOPICAL
Qty: 1 | Refills: 0 | Status: ACTIVE | COMMUNITY
Start: 2024-06-26 | End: 1900-01-01

## 2024-06-27 PROBLEM — N90.89 VULVAR FISSURE: Status: ACTIVE | Noted: 2022-08-04

## 2024-08-09 NOTE — ED PROVIDER NOTE - INTERNATIONAL TRAVEL
No Detail Level: Zone Otc Regimen: CLn wash daily for up to 2 weeks when flared and 1-2 times a week as maintenance. Leave in 2-3 minutes then rinse off Plan: Rx declined by patient Detail Level: Detailed Discontinue Regimen: Desonide lotion, may restart twice a day for up to 2 weeks if reflares Otc Regimen: Zeasorb powder in the morning.\\nCetaphil or CeraVe lotion at night

## 2024-08-27 ENCOUNTER — APPOINTMENT (OUTPATIENT)
Dept: UROGYNECOLOGY | Facility: CLINIC | Age: 34
End: 2024-08-27

## 2024-08-27 VITALS
BODY MASS INDEX: 20.98 KG/M2 | HEIGHT: 62 IN | WEIGHT: 114 LBS | DIASTOLIC BLOOD PRESSURE: 60 MMHG | SYSTOLIC BLOOD PRESSURE: 100 MMHG

## 2024-08-27 LAB
BILIRUB UR QL STRIP: NEGATIVE
CLARITY UR: CLEAR
COLLECTION METHOD: NORMAL
GLUCOSE UR-MCNC: NEGATIVE
HCG UR QL: 0.2 EU/DL
HGB UR QL STRIP.AUTO: NORMAL
KETONES UR-MCNC: NEGATIVE
LEUKOCYTE ESTERASE UR QL STRIP: NORMAL
NITRITE UR QL STRIP: NEGATIVE
PH UR STRIP: 6
PROT UR STRIP-MCNC: NEGATIVE
SP GR UR STRIP: 1.02

## 2024-08-27 PROCEDURE — 99459 PELVIC EXAMINATION: CPT

## 2024-08-27 PROCEDURE — 81003 URINALYSIS AUTO W/O SCOPE: CPT | Mod: QW

## 2024-08-27 PROCEDURE — 99214 OFFICE O/P EST MOD 30 MIN: CPT | Mod: 25

## 2024-08-27 NOTE — HISTORY OF PRESENT ILLNESS
[Rectal Prolapse] : no [Unable To Restrain Bowel Movement] : no [Urinary Frequency] : no [Feelings Of Urinary Urgency] : no [Urinary Frequency More Than Twice At Night (Nocturia)] : no nocturia [Urinary Tract Infection] : no [Constipation Obstructed Defecation] : no [] : no [Vaginal Pain] : no [Pelvic Pain] : no [FreeTextEntry1] : RJ is a 34 year female who presents for vulvar fissure. Reports postcoital bleeding and continuous fissuring of vaginal mucosa at 6 o'clock area post coitus. This has been an issue for 4 and a half years now. Tried different lubes, PF PT, TPI, estrogen cream, valium suppositories. Most recently started using Tacrolimus but states it has not helped. She is interested in surgery but planning on having children soon. No bothersome urinary symptoms.   Seen by Jessica Mesa for vulvar fissure and AYUSH, last seen on 06/26/2024, advised continue E/T cream, continue dilation, may use topical lido 7.5% in aloe prior to dilation/intercourse, d/c Clobetasol, prescribed Tacrolimus 0.03% ointment. Negative biopsy done with Jessica. Tested for STIs, negative results.   Daytime frequency: No Nocturia: No Urinary urgency: No Leakage of urine with urgency: No Leakage of urine with coughing sneezing laughing: No Sensation of incomplete bladder emptying: No History of frequent urinary tract infections: No History of hematuria: No Previous treatment: PF PT, Baclofen/valium vaginal suppositories, Clobetasol, E/T cream Vaginal symptoms: Denies Bowel symptoms: Denies     OB: None GYN: H/o HPV 03/2021 mRNA positive HPV 16/18/45 RNA negative - colpo done normal results PMH: Psoriasis, HPV PSH: [place] teeth, Liposuction Meds: Spironolactone, Drospirenone Allx: Macrobid

## 2024-08-27 NOTE — PHYSICAL EXAM
[Chaperone Present] : A chaperone was present in the examining room during all aspects of the physical examination [26010] : A chaperone was present during the pelvic exam. [No Acute Distress] : in no acute distress [Well developed] : well developed [Well Nourished] : ~L well nourished [Oriented x3] : oriented to person, place, and time [No Edema] : ~T edema was not present [Warm and Dry] : was warm and dry to touch [Labia Majora] : were normal [Labia Minora] : were normal [Normal Appearance] : general appearance was normal [Normal] : normal [Soft] :  the cervix was soft [Uterine Adnexae] : were not tender and not enlarged [FreeTextEntry2] :  Carolyn Vásquez [Cough] : no cough [Tenderness] : ~T no ~M abdominal tenderness observed [Distended] : not distended [FreeTextEntry4] : 2 mm tear posterior perineum  [de-identified] : no POP

## 2024-08-27 NOTE — HISTORY OF PRESENT ILLNESS
[Rectal Prolapse] : no [Unable To Restrain Bowel Movement] : no [Urinary Frequency] : no [Feelings Of Urinary Urgency] : no [Urinary Frequency More Than Twice At Night (Nocturia)] : no nocturia [Urinary Tract Infection] : no [Constipation Obstructed Defecation] : no [] : no [Vaginal Pain] : no [Pelvic Pain] : no [FreeTextEntry1] : RJ is a 34 year female who presents for vulvar fissure. Reports postcoital bleeding and continuous fissuring of vaginal mucosa at 6 o'clock area post coitus. This has been an issue for 4 and a half years now. Tried different lubes, PF PT, TPI, estrogen cream, valium suppositories. Most recently started using Tacrolimus but states it has not helped. She is interested in surgery but planning on having children soon. No bothersome urinary symptoms.   Seen by Jessica Mesa for vulvar fissure and AYUSH, last seen on 06/26/2024, advised continue E/T cream, continue dilation, may use topical lido 7.5% in aloe prior to dilation/intercourse, d/c Clobetasol, prescribed Tacrolimus 0.03% ointment. Negative biopsy done with Jessica. Tested for STIs, negative results.   Daytime frequency: No Nocturia: No Urinary urgency: No Leakage of urine with urgency: No Leakage of urine with coughing sneezing laughing: No Sensation of incomplete bladder emptying: No History of frequent urinary tract infections: No History of hematuria: No Previous treatment: PF PT, Baclofen/valium vaginal suppositories, Clobetasol, E/T cream Vaginal symptoms: Denies Bowel symptoms: Denies     OB: None GYN: H/o HPV 03/2021 mRNA positive HPV 16/18/45 RNA negative - colpo done normal results PMH: Psoriasis, HPV PSH: [place] teeth, Liposuction Meds: Spironolactone, Drospirenone Allx: Macrobid

## 2024-08-27 NOTE — DISCUSSION/SUMMARY
[FreeTextEntry1] : RJ is a 34 year female who presents for vulvar fissure. On exam, no POP and 2 mm tear posterior perineum. She is planning on getting pregnant in a few months.  Discussed surgical correction however advised to wait until after she has children and to advise obgyn that delivers her of the tear.   [] UA, Cx   f/u as needed All questions answered.

## 2024-08-27 NOTE — PROCEDURE
134.9 [Straight Catheterization] : insertion of a straight catheter [Urinary Frequency] : urinary frequency [Patient] : the patient [___ Fr Straight Tip] : a [unfilled] in [place] straight tip catheter [None] : there were no complications with the catheter insertion [Clear] : clear [No Complications] : no complications [Tolerated Well] : the patient tolerated the procedure well [Post procedure instructions and information given] : Post procedure instructions and information were given and reviewed with patient. [0] : 0

## 2024-08-27 NOTE — HISTORY OF PRESENT ILLNESS
[Rectal Prolapse] : no [Unable To Restrain Bowel Movement] : no [Urinary Frequency] : no [Feelings Of Urinary Urgency] : no [Urinary Frequency More Than Twice At Night (Nocturia)] : no nocturia [Urinary Tract Infection] : no [Constipation Obstructed Defecation] : no [] : no [Pelvic Pain] : no [Vaginal Pain] : no [FreeTextEntry1] : RJ is a 34 year female who presents for vulvar fissure. Reports postcoital bleeding and continuous fissuring of vaginal mucosa at 6 o'clock area post coitus. This has been an issue for 4 and a half years now. Tried different lubes, PF PT, TPI, estrogen cream, valium suppositories. Most recently started using Tacrolimus but states it has not helped. She is interested in surgery but planning on having children soon. No bothersome urinary symptoms.   Seen by Jessica Mesa for vulvar fissure and AYUSH, last seen on 06/26/2024, advised continue E/T cream, continue dilation, may use topical lido 7.5% in aloe prior to dilation/intercourse, d/c Clobetasol, prescribed Tacrolimus 0.03% ointment. Negative biopsy done with Jessica. Tested for STIs, negative results.   Daytime frequency: No Nocturia: No Urinary urgency: No Leakage of urine with urgency: No Leakage of urine with coughing sneezing laughing: No Sensation of incomplete bladder emptying: No History of frequent urinary tract infections: No History of hematuria: No Previous treatment: PF PT, Baclofen/valium vaginal suppositories, Clobetasol, E/T cream Vaginal symptoms: Denies Bowel symptoms: Denies     OB: None GYN: H/o HPV 03/2021 mRNA positive HPV 16/18/45 RNA negative - colpo done normal results PMH: Psoriasis, HPV PSH: [place] teeth, Liposuction Meds: Spironolactone, Drospirenone Allx: Macrobid

## 2024-08-27 NOTE — PHYSICAL EXAM
[Chaperone Present] : A chaperone was present in the examining room during all aspects of the physical examination [72248] : A chaperone was present during the pelvic exam. [No Acute Distress] : in no acute distress [Well developed] : well developed [Well Nourished] : ~L well nourished [Oriented x3] : oriented to person, place, and time [No Edema] : ~T edema was not present [Warm and Dry] : was warm and dry to touch [Labia Majora] : were normal [Labia Minora] : were normal [Normal Appearance] : general appearance was normal [Normal] : normal [Soft] :  the cervix was soft [Uterine Adnexae] : were not tender and not enlarged [FreeTextEntry2] :  Carolyn Vásquez [Cough] : no cough [Tenderness] : ~T no ~M abdominal tenderness observed [Distended] : not distended [FreeTextEntry4] : 2 mm tear posterior perineum  [de-identified] : no POP

## 2024-08-27 NOTE — ADDENDUM
[FreeTextEntry1] : This note was written by Carolyn Vásquez, acting as the  for Dr. Reynoso. This note accurately reflects the work and decisions made by Dr. Reynoso.

## 2024-08-27 NOTE — PROCEDURE
[Straight Catheterization] : insertion of a straight catheter [Urinary Frequency] : urinary frequency [Patient] : the patient [___ Fr Straight Tip] : a [unfilled] in [place] straight tip catheter [None] : there were no complications with the catheter insertion [Clear] : clear [No Complications] : no complications [Tolerated Well] : the patient tolerated the procedure well [Post procedure instructions and information given] : Post procedure instructions and information were given and reviewed with patient. [0] : 0

## 2024-08-27 NOTE — PHYSICAL EXAM
[Chaperone Present] : A chaperone was present in the examining room during all aspects of the physical examination [60896] : A chaperone was present during the pelvic exam. [No Acute Distress] : in no acute distress [Well developed] : well developed [Well Nourished] : ~L well nourished [Oriented x3] : oriented to person, place, and time [No Edema] : ~T edema was not present [Warm and Dry] : was warm and dry to touch [Labia Majora] : were normal [Labia Minora] : were normal [Normal Appearance] : general appearance was normal [Normal] : normal [Soft] :  the cervix was soft [Uterine Adnexae] : were not tender and not enlarged [FreeTextEntry2] :  Carolyn Vásquez [Cough] : no cough [Tenderness] : ~T no ~M abdominal tenderness observed [Distended] : not distended [FreeTextEntry4] : 2 mm tear posterior perineum  [de-identified] : no POP

## 2024-08-27 NOTE — PROCEDURE
[Straight Catheterization] : insertion of a straight catheter [Urinary Frequency] : urinary frequency [Patient] : the patient [___ Fr Straight Tip] : a [unfilled] in [place] straight tip catheter [None] : there were no complications with the catheter insertion [Clear] : clear [No Complications] : no complications [Tolerated Well] : the patient tolerated the procedure well [Post procedure instructions and information given] : Post procedure instructions and information were given and reviewed with patient. [0] : 0

## 2024-08-28 LAB
APPEARANCE: CLEAR
BACTERIA: NEGATIVE /HPF
BILIRUBIN URINE: NEGATIVE
BLOOD URINE: NEGATIVE
CAST: 0 /LPF
COLOR: YELLOW
EPITHELIAL CELLS: 1 /HPF
GLUCOSE QUALITATIVE U: NEGATIVE MG/DL
KETONES URINE: NEGATIVE MG/DL
LEUKOCYTE ESTERASE URINE: NEGATIVE
MICROSCOPIC-UA: NORMAL
NITRITE URINE: NEGATIVE
PH URINE: 6
PROTEIN URINE: NEGATIVE MG/DL
RED BLOOD CELLS URINE: 5 /HPF
SPECIFIC GRAVITY URINE: 1.02
UROBILINOGEN URINE: 0.2 MG/DL
WHITE BLOOD CELLS URINE: 1 /HPF

## 2024-08-29 LAB — BACTERIA UR CULT: NORMAL

## 2024-08-30 ENCOUNTER — APPOINTMENT (OUTPATIENT)
Dept: UROGYNECOLOGY | Facility: CLINIC | Age: 34
End: 2024-08-30
Payer: COMMERCIAL

## 2024-08-30 VITALS
WEIGHT: 114 LBS | DIASTOLIC BLOOD PRESSURE: 81 MMHG | TEMPERATURE: 97.9 F | SYSTOLIC BLOOD PRESSURE: 111 MMHG | HEIGHT: 62 IN | BODY MASS INDEX: 20.98 KG/M2

## 2024-08-30 DIAGNOSIS — L98.9 DISORDER OF THE SKIN AND SUBCUTANEOUS TISSUE, UNSPECIFIED: ICD-10-CM

## 2024-08-30 DIAGNOSIS — K60.2 ANAL FISSURE, UNSPECIFIED: ICD-10-CM

## 2024-08-30 DIAGNOSIS — N90.89 OTHER SPECIFIED NONINFLAMMATORY DISORDERS OF VULVA AND PERINEUM: ICD-10-CM

## 2024-08-30 DIAGNOSIS — N39.0 URINARY TRACT INFECTION, SITE NOT SPECIFIED: ICD-10-CM

## 2024-08-30 PROCEDURE — 99213 OFFICE O/P EST LOW 20 MIN: CPT

## 2024-08-30 NOTE — HISTORY OF PRESENT ILLNESS
[FreeTextEntry1] : Emily is here for a f/u appointment.   She has been using topical E/T cream ftp to introitus QHS. She reports that her lubrication has significantly improved. She is still fissuring with intercourse, which has improved since her last visit, but continues to occur.  She has been using topical tacrolimus 0.03% QD. She has not noticed any change in fissures since using. She is using dilators 3x/week. No fissures with dilator use. Dilator is the same size as her partner. She saw Dr. Reynoso for another opinion last week. She agrees to wait unitl after she has kids to do any surgical intervention. She just started using a CBD lube. She denies dyspareunia. She reports that the fissures are not painful, just uncomfortable x 24-36 hours afterwards.   No UTI symptoms today. Denies symptoms of vaginal infection today.  She is getting  in November. Going to [place] for a month for her honeymoon. Planning on trying to conceive in January 2025. She is planning on stopping OCPs in December 2024.

## 2024-08-30 NOTE — DISCUSSION/SUMMARY
[FreeTextEntry1] : Hold injections. Continue E/T cream at original strength to introitus 3x/week to hs as needed. Continue dilation with large dilator 4-5x/week. May use topical lidocaine 7.5% in aloe gel prior to dilation and intercourse (cautioned Emily to put on lidocaine x 5 mins then wipe off excess prior to P-V penetration or her fiancee will become numb).    d/c tacrolimus 0.03% ointment. If any worsening of fissures after d/c, may then resume 3x/week.  Emily has rxs for UA, C&S and knows that at the first s/s of UTI to go to the lab with the rx for UA and C&S and drop off specimen. She understands that she needs to call the office 3-4d after she drops off her urine for the results. In the interim, she may take OTC AZO prn with food and should increase water intake. If + cultured recurrent UTIs, will put her on ppx.  Will f/u with Dr. Reynoso for sx intervention when she wants to proceed.  RTO prn
